# Patient Record
(demographics unavailable — no encounter records)

---

## 2025-03-24 NOTE — PROCEDURE
[FreeTextEntry1] : Full PFT reveals mild obstructive dysfunction; FEV1 was 2.29L which is 77% of predicted; unable lung volumes; severely reduced diffusion at 9.56, which is 37% of predicted; normal flow volume loop. PFTs were performed to evaluate for SOB  6 minute walk test reveals a low saturation of 94%, max HR 73; walked 32.6 meters. The test was paused before 6 minutes due to foot pain from an open wound.   FENO was 32; a normal value being less than 25 Fractional exhaled nitric oxide (FENO) is regarded as a simple, noninvasive method for assessing eosinophilic airway inflammation. Produced by a variety of cells within the lung, nitric oxide (NO) concentrations are generally low in healthy individuals. However, high concentrations of NO appear to be involved in nonspecific host defense mechanisms and chronic inflammatory diseases such as asthma. The American Thoracic Society (ATS) therefore has recommended using FENO to aid in the diagnosis and monitoring of eosinophilic airway inflammation and asthma, and for identifying steroid responsive individuals whose chronic respiratory symptoms may be caused by airway inflammation.

## 2025-03-24 NOTE — PHYSICAL EXAM
[No Acute Distress] : no acute distress [Normal Oropharynx] : normal oropharynx [III] : Mallampati Class: III [Normal Appearance] : normal appearance [No Neck Mass] : no neck mass [Normal Rate/Rhythm] : normal rate/rhythm [Normal S1, S2] : normal s1, s2 [No Resp Distress] : no resp distress [Clear to Auscultation Bilaterally] : clear to auscultation bilaterally [Benign] : benign [Normal Gait] : normal gait [No Clubbing] : no clubbing [No Cyanosis] : no cyanosis [FROM] : FROM [Normal Color/ Pigmentation] : normal color/ pigmentation [No Focal Deficits] : no focal deficits [Oriented x3] : oriented x3 [Normal Affect] : normal affect [Kyphosis] : kyphosis [No Edema] : no edema [TextBox_2] : OW [TextBox_54] : 2/6 systolic murmur [TextBox_68] : I:E ratio 1:3; clear

## 2025-03-24 NOTE — HISTORY OF PRESENT ILLNESS
[FreeTextEntry1] : Mr. Chun is a 76-year-old male with a history of abnormal chest CT, allergic rhinitis, asthma, obesity, DYLAN, PAH, sarcoidosis, and SOB who presents to the office for a follow-up pulmonary evaluation. His chief complaint is   -he notes energy levels are lower due to 2 years of wounds on his legs from thin skin -he notes diabetic neuropathy. He had a deep wound on 1 toe, unrecognized -he notes s/p podiatry evaluation -he notes weight is stable  -he notes he's eating too much sugar and eating out frequently. His #1 complaint is he's not eating regularly scheduled, healthy meals -he notes sleeping 6.5-7 hours, interrupted by his wife's snoring -he notes Dr. Booth wanted him to get a weekly infusion of a medication for PAH. He refused due to the frequency of the medication -he notes he ate beet chews for 2-3 weeks, and he had less difficulty walking upstairs -he notes he's on digestive enzymes -he notes his kidney function is 25%  -he denies any headaches, nausea, emesis, fever, chills, sweats, chest pain, chest pressure, coughing, wheezing, palpitations, diarrhea, constipation, dysphagia, vertigo, arthralgias, myalgias, leg swelling, itchy eyes, itchy ears, heartburn, reflux, or sour taste in the mouth.

## 2025-03-24 NOTE — ADDENDUM
[FreeTextEntry1] : Documented by Teri Barrientos acting as a scribe for Dr. Amilcar Soliman on 03/24/2025. All medical record entries made by the Scribe were at my, Dr. Amilcar Soliman's, direction and personally dictated by me on 03/24/2025. I have reviewed the chart and agree that the record accurately reflects my personal performance of the history, physical exam, assessment and plan. I have also personally directed, reviewed, and agree with the discharge instructions.

## 2025-03-24 NOTE — ASSESSMENT
[FreeTextEntry1] : Mr. Chun is a 76-year-old male with a history of asthma, allergy, OSAS, OW, AVDZ, PAH, DM, HTN, and HLD- improved and stable from a pulmonary perspective with weight loss (continued). - improved, Abnormal CT - s/p Covid 19 Sx- mild Sx 6/2022- resolved- @baseline, healing wound on leg; Confirmed PAH WHO Group 1 (Asymptomatic)- abnormal CT SUSAN nodule 1.2 cm 11/2023- s/p 2/2024 progressive RUL nodule 1.4 cm x 1.4 cm c/w minimally invasive adenocarcinoma- concerns over PAH (not on Rx)   His SOB is multifactorial: -overweight / out of shape -poor breathing mechanics -COPD -severe PAH (PASP 101 mm Hg 3/2024) -cardiac disease  problem 1: COPD /asthma (stable) -continue Stiolto 2 inhalations QD -continue Qvar Redihaler 80 2 puffs BID  -continue Ventolin 2 puffs Q6H, pre-exercise -Inhaler technique reviewed as well as oral hygiene techniques reviewed with patient. Avoidance of cold air, extremes of temperature; rescue inhaler should be used before exercise. Order of medication reviewed with patient. Recommended use of a cool mist humidifier in the bedroom. -COPD is a progressive disease and although it can't be cured, appropriate management can slow its progression, reduce frequency and severity of exacerbations, improve symptoms, and the patient's quality of life. Hospitalizations are the greatest contributor to the total COPD costs and account for up to 87% of total COPD related costs. Exacerbations are the main cause of admissions and subsequently account for the 40-75% of COPD costs. Inhaled maintenance therapy reduces the incidence of exacerbations in patients with stable COPD. Incorrect inhaler use and nonadherence are major obstacles to achieving COPD treatment goals. Many COPD patients have challenges (impaired inhalation, limited dexterity, reduced cognition) that limit their ability to correctly use their COPD treatment devices resulting in reduced symptom control. Of most importance is smoking cessation, early intervention with respiratory illnesses, and contemplation for pulmonary rehab to enhance quality of life. -Asthma is believed to be caused by inherited (genetic) and environmental factor, but its exact cause is unknown. Asthma may be triggered by allergens, lung infections, or irritants in the air. Asthma triggers are different for each person.  Problem 1A:  Covid 19 - resolved- 6/2022 -s/p Paxlovid course x 5 days ([place]) -complete quarantine -add [place] Cold & Flu  Problem 2: Allergic rhinitis -continue nasal saline -continue Atrovent (0.06) nasal spray 1 sniff BID -Environmental measures for allergies were encouraged including mattress and pillow cover, air purifier, and environmental controls.  Problem 3: OSAS -continue using nasal CPAP mask contemplating changing to [employer]ear -Discussed the risks/associations with coronary artery disease, atrial fibrillation, arrhythmia, memory loss, issues with concentration, stroke risk, hypertension, nocturia, chronic reflux/Serrato's esophagus some but not all inclusive. Treatment options discussed including CPAP/BiPAP machine, oral appliance, ProVent therapy, Oxy-Aid by [employer]pite[employer], new technologies, or positional sleep.  Problem 4: abnormal CT scan/lung cancer screening (new SUSAN 1.2 cm nodule) c/w minimally invasive adenocarcinoma -CTS eval with Dr. Claudio Moulton -s/p NODIFY WNL -s/p CT 2/2024- progressive - next CT 9/2025 -Lung Cancer Screening is recommended for people between the ages of 55 and 80 with prior 30+ pack year smoking histories. There is irrefutable evidence for realization of lung cancer screening based on two large randomized control trials demonstrating relative reduction in lung cancer mortality for patients undergoing low-dose CT scanning. Risks and benefits reviewed with the patient.  problem 5: cardiac disease -recommended to Dr. Booth / Danie - s/p cardiac cath JANY: 56  problem 6: PAH (WHO Group I)- PASP 101 mm Hg 3/2024 -s/p Adcirca 40mg AM / Opsumit 10mg AM- unable- move to Winrevair 1 injection q3 weeks  - get LFTs monthly if resumes regimen -Disorder of the pulmonary arteries, important to distinguish the difference between pulmonary arterial hypertension which is idiopathic versus secondary pulmonary hypertension which is related to heart disease being diastolic dysfunction or congestive heart failure or other forms of pulmonary hypertension. Diagnostics include an initial echocardiogram evaluating the pulmonary artery pressures, if this is abnormal, to proceed with a VQ scan as well as a CTPA and an eventual right heart catheterization to absolutely confirm the echocardiogram findings. (No medication can be prescribed until the right heart catheterization). If present, the evaluation will include rheumatological blood testing, HIV testing, and potential evaluation for cirrhosis. Drug classes include PED5 (Revatio, Adcirca) ETRA (Tracleer, Macitentan, Letairis), Soluble guanylate cyclase (Adempas) Prostacyclins (Uptravi, Tyavso, Ventavis, Remodulin, or Orenitram derivatives).  problem 7: overweight/ out of shape -recommended "10-Day Detox Diet" by Dr. Wesly Moulton -Weight loss, exercise, and diet control were discussed and are highly encouraged. Treatment options are given such as, aqua therapy, and contacting a nutritionist. Recommended to use the elliptical, stationary bike, less use of treadmill.  problem 8: health maintenance -s/p COVID 19 vaccine x5  Immune Support Recommendations: -OTC Vitamin C 500mg BID -OTC Quercetin 250-500mg BID -OTC Zinc 75-100mg per day -OTC Melatonin 1 or 2 mg a night -OTC Vitamin D 1-4000mg per day -OTC Tonic Water 8oz per day  -s/p flu shot 2024 -Excellent candidate for pulmonary rehab, - quality medical -Pneumovax administered in our office 2022 -recommended strep pneumonia vaccines: Prevnar-13 vaccine (2016), 20 (2023) followed by Pneumo vaccine 23 (2013/2017) one year following (18/2019 - completed) -recommended early intervention for URIs -recommended regular osteoporosis evaluations -recommended early dermatological evaluations -recommended after the age of 50 to the age of 70, colonoscopy every 5 years  F/P in 3-4 months

## 2025-03-24 NOTE — REASON FOR VISIT
[Follow-Up] : a follow-up visit [TextBox_44] : abnormal chest CT, allergic rhinitis, asthma, obesity, DYLAN, PAH (PASP 101 mm Hg 3/2024), sarcoidosis, and SOB